# Patient Record
Sex: FEMALE | Race: WHITE | NOT HISPANIC OR LATINO | Employment: STUDENT | URBAN - METROPOLITAN AREA
[De-identification: names, ages, dates, MRNs, and addresses within clinical notes are randomized per-mention and may not be internally consistent; named-entity substitution may affect disease eponyms.]

---

## 2019-12-06 ENCOUNTER — APPOINTMENT (OUTPATIENT)
Dept: RADIOLOGY | Facility: OTHER | Age: 19
End: 2019-12-06
Payer: COMMERCIAL

## 2019-12-06 ENCOUNTER — OFFICE VISIT (OUTPATIENT)
Dept: OBGYN CLINIC | Facility: OTHER | Age: 19
End: 2019-12-06
Payer: COMMERCIAL

## 2019-12-06 VITALS
BODY MASS INDEX: 37.73 KG/M2 | HEIGHT: 64 IN | WEIGHT: 221 LBS | HEART RATE: 58 BPM | DIASTOLIC BLOOD PRESSURE: 81 MMHG | SYSTOLIC BLOOD PRESSURE: 119 MMHG

## 2019-12-06 DIAGNOSIS — M79.671 RIGHT FOOT PAIN: ICD-10-CM

## 2019-12-06 DIAGNOSIS — S99.921A INJURY OF RIGHT FOOT, INITIAL ENCOUNTER: Primary | ICD-10-CM

## 2019-12-06 PROCEDURE — 73620 X-RAY EXAM OF FOOT: CPT

## 2019-12-06 PROCEDURE — 73630 X-RAY EXAM OF FOOT: CPT

## 2019-12-06 PROCEDURE — 99204 OFFICE O/P NEW MOD 45 MIN: CPT | Performed by: FAMILY MEDICINE

## 2019-12-06 RX ORDER — RANITIDINE 150 MG/1
TABLET ORAL
Refills: 5 | COMMUNITY
Start: 2019-11-12

## 2019-12-06 RX ORDER — LORATADINE 10 MG/1
TABLET ORAL
Refills: 11 | COMMUNITY
Start: 2019-09-26

## 2019-12-06 RX ORDER — CITALOPRAM 20 MG/1
20 TABLET ORAL DAILY
COMMUNITY

## 2019-12-06 RX ORDER — NORGESTIMATE AND ETHINYL ESTRADIOL
1 KIT DAILY
Refills: 5 | COMMUNITY
Start: 2019-11-09

## 2019-12-06 NOTE — PATIENT INSTRUCTIONS
Explained the patient that she has clinical evidence of possible Lisfranc injury on her examination today  As such, I ordered MRI to be performed of her right foot  While waiting her MRI results and interpretation by physician, I recommended nonweightbearing status with heart flat soled shoe  I also recommend against participating in dance  I also explained the patient risk of compartment syndrome including worsening swelling with worsening pain, numbness or tingling or pain and instructed to have immediate evaluation by physician or go to emergency department if develops  Patient expressed understanding and agreed to plan

## 2019-12-06 NOTE — PROGRESS NOTES
1  Injury of right foot, initial encounter  XR foot 3+ vw right    CANCELED: XR foot 3+ vw right    CANCELED: XR foot 3+ vw left     Orders Placed This Encounter   Procedures    XR foot 3+ vw right        Imaging Studies (I personally reviewed images in PACS and report):  X-ray right foot 12/06/2019:  No acute osseous abnormality  X-ray bilateral foot comparison weight-bearing 12/06/2019:  No acute osseous abnormality  No definitive Lisfranc injury  IMPRESSION:  Right foot injury  Midfoot pain  + pronation abduction test concerning for Lisfranc injury    Differential diagnosis:  Lisfranc injury  Contusion      Repeat X-ray next visit:   None      Return for Follow-up after MRI  Patient Instructions    Explained the patient that she has clinical evidence of possible Lisfranc injury on her examination today  As such, I ordered MRI to be performed of her right foot  While waiting her MRI results and interpretation by physician, I recommended nonweightbearing status with heart flat soled shoe  I also recommend against participating in dance  I also explained the patient risk of compartment syndrome including worsening swelling with worsening pain, numbness or tingling or pain and instructed to have immediate evaluation by physician or go to emergency department if develops  Patient expressed understanding and agreed to plan  CHIEF COMPLAINT:  Right foot injury    HPI:  Yossi Leigh is a 20 y o  female  who presents for   Past medical history:  Significant for right ipsilateral foot injury approximately 8 months ago diagnosed with a foot contusion by  with resolved pain    Visit 12/06/2019:  Evaluation of right foot injury that occurred yesterday 12/05/2019 when 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 and landed on her forefoot result in in cracking sensation and immediate pain    Since then she describes constant sharp dull soreness 1 of 10 and at times worse exacerbated by walking  She has also noticed associated symptoms do include cracking and popping and points to the lateral forefoot  She was given a hard-soled postop shoe to wear by   She has not had any x-rays at this time  Today, she noticed no significant improvement  She continues to have swelling  She has been unable to participate in dance per        Review of Systems   Constitutional: Negative for chills, fever and unexpected weight change  HENT: Negative for hearing loss, nosebleeds and sore throat  Eyes: Negative for pain, redness and visual disturbance  Respiratory: Negative for cough, shortness of breath and wheezing  Cardiovascular: Negative for chest pain, palpitations and leg swelling  Gastrointestinal: Negative for abdominal distention, nausea and vomiting  Endocrine: Negative for polydipsia and polyuria  Genitourinary: Negative for dysuria and hematuria  Skin: Negative for rash and wound  Neurological: Negative for dizziness, numbness and headaches  Psychiatric/Behavioral: Negative for decreased concentration and suicidal ideas  Following history reviewed and update:    Past Medical History:   Diagnosis Date    Asthma      Past Surgical History:   Procedure Laterality Date    KNEE SURGERY       Social History   Social History     Substance and Sexual Activity   Alcohol Use Not on file     Social History     Substance and Sexual Activity   Drug Use Not on file     Social History     Tobacco Use   Smoking Status Never Smoker   Smokeless Tobacco Never Used     No family history on file    No Known Allergies       Physical Exam  /81 (BP Location: Right arm, Patient Position: Sitting, Cuff Size: Adult)   Pulse 58   Ht 5' 4" (1 626 m)   Wt 100 kg (221 lb)   BMI 37 93 kg/m²     Constitutional:  see vital signs  Gen: well-developed, normocephalic/atraumatic, well-groomed  Eyes: No inflammation or discharge of conjunctiva or lids; sclera clear   Pharynx: no inflammation, lesion, or mass of lips  Neck: supple, no masses, non-distended  MSK: no inflammation, lesion, mass, or clubbing of nails and digits except for other than mentioned below  SKIN: no visible rashes or skin lesions  Pulmonary/Chest: Effort normal  No respiratory distress     NEURO: cranial nerves grossly intact  PSYCH:  Alert and oriented to person, place, and time; recent and remote memory intact; mood normal, no depression, anxiety, or agitation, judgment and insight good and intact     Ortho Exam  RIGHT ANKLE & FOOT EXAM  Observation  GAIT:  normal    Inspection  Erythema: no  Ecchymosis: no  Edema:  none      Tenderness  Proximal Fibula: no  (Maisonneuve frx)  AiTFL: no  (2cm proximal-medial to tip lateral malleolus 92% sens, 29% spec)  ATFL: no  CFL: no  PTFL: no  Achilles:  no  deltoid: No  Peroneal: no  Tibialis Anterior: no  Tibialis Posterior: no    Bony Tenderpoints:  Lateral Malleolus: no  Base of 5th MT: no  Medial Malleolus: no  Navicular: no  Talar dome: No    ROM  Dorsiflexion: intact  Plantarflexion: intact    Muscle Strength  Pronation: intact without pain  Supination: intact without pain    Tib-Fib Squeeze: negative  (zbactjtorjlu-gq-tykmhdnpcpzqup squeeze; 26% sens, 88% spec; rule in test)    Calcaneal Squeeze: negative    Dorsiflexion (+) ER Stress Test: negative  (reproduce ATiFL mech; 71% sens, 63% spec)          Right Calf exam:  No swelling erythema or increased warmth  No palpable cords  Tenderness: none  Po Dorsiflexion DVT Test: Negative  (slight knee flexion, passive abrupt ankle dorsiflexion, squeeze calf)    Right foot exam  No swelling, erythema or increased warmth  Tenderness: none  ROM Toes extension: intact  ROM Toes flexion: intact  Strength Toes: 5/5 flex, ext  Sensation intact  Capillary refill intact    Right Lisfranc Assessment:  Plantar Ecchymosis:   Pronation Abduction test:   (forefoot abducted, pronate foot, hindfoot kept in place)  Tarsometatarsal Squeeze test:  (thumb lateral midfoot, other fingers medial midfoot, squeeze 1st & 5th rays)  Single Limb Heel Rise:  (unilateral stand on "tip toe":)  Piano Key Test:   (hindfoot stabilized, passive dorsiflexion & plantar flexion at TMT joint)     Procedures

## 2019-12-09 ENCOUNTER — HOSPITAL ENCOUNTER (OUTPATIENT)
Dept: RADIOLOGY | Facility: IMAGING CENTER | Age: 19
Discharge: HOME | End: 2019-12-09
Payer: COMMERCIAL

## 2019-12-09 DIAGNOSIS — S99.921A INJURY OF RIGHT FOOT, INITIAL ENCOUNTER: ICD-10-CM

## 2019-12-09 PROCEDURE — 73718 MRI LOWER EXTREMITY W/O DYE: CPT

## 2019-12-09 RX ORDER — NORGESTIMATE AND ETHINYL ESTRADIOL 7DAYSX3 28
KIT ORAL
COMMUNITY
Start: 2018-06-02 | End: 2019-12-10 | Stop reason: HOSPADM

## 2019-12-09 RX ORDER — AMITRIPTYLINE HYDROCHLORIDE 10 MG/1
TABLET, FILM COATED ORAL
COMMUNITY
End: 2019-12-10 | Stop reason: HOSPADM

## 2019-12-09 RX ORDER — GABAPENTIN 100 MG/1
CAPSULE ORAL
COMMUNITY
End: 2019-12-10 | Stop reason: HOSPADM

## 2019-12-09 RX ORDER — CHOLECALCIFEROL (VITAMIN D3) 125 MCG
CAPSULE ORAL
COMMUNITY

## 2019-12-10 ENCOUNTER — OFFICE VISIT (OUTPATIENT)
Dept: OBGYN CLINIC | Facility: OTHER | Age: 19
End: 2019-12-10
Payer: COMMERCIAL

## 2019-12-10 VITALS — HEART RATE: 73 BPM | SYSTOLIC BLOOD PRESSURE: 121 MMHG | DIASTOLIC BLOOD PRESSURE: 76 MMHG

## 2019-12-10 DIAGNOSIS — S99.921A INJURY OF RIGHT FOOT, INITIAL ENCOUNTER: Primary | ICD-10-CM

## 2019-12-10 PROCEDURE — 99213 OFFICE O/P EST LOW 20 MIN: CPT | Performed by: FAMILY MEDICINE

## 2019-12-10 NOTE — PROGRESS NOTES
1  Injury of right foot, initial encounter       No orders of the defined types were placed in this encounter  Imaging Studies (I personally reviewed images in PACS and report):  MRI right foot 12/10/2019:  Unremarkable MRI of the right foot  Intact Lisfranc ligament  Past diagnostics:    X-ray right foot 12/06/2019:  No acute osseous abnormality  X-ray bilateral foot comparison weight-bearing 12/06/2019:  No acute osseous abnormality  No definitive Lisfranc injury  IMPRESSION:  Right foot injury      Repeat X-ray next visit:   None      Return for Follow-up after holiday break  Patient Instructions   Explained to patient and her mother that her MRI does not reveal any evidence of fracture or any severe ligamentous sprain  As such, I recommended gradual transition as tolerated to full weight-bearing  Patient is upcoming for school break and I recommended that they acquire CD images and reports in case she does not have any improvement for next 1-2 weeks  If so recommended follow-up with orthopedic office in her hometown  Patient mother expressed understanding agreed to plan  Educated risks of mixing NSAIDS (also known as non-steroidal anti-inflammatory pills) including advil, ibuprofen, motrin, aleve, naproxen, aspirin, and ibuprofen containing products with each other or with steroids (such as prednisone, medrol)  Explained risks of mixing these medications including stomach ulcer, severe internal bleeding, and kidney failure  Instructed not to take NSAIDS if have history of stomach ulcers, kidney issues, or hypertension  Instructed patient to use only one brand as prescribed  For naproxen, a maximum of 500 mg per dose every 12 hours and no more than two doses or 1,000mg per day  For Ibuprofen, a maximum of 800 mg per dose every 6 hours but no more than 3 doses or 2,400 mg per day  Never take these medications together  Never take these medications the same day   For severe pain and only if you have no liver problems, you may add Tylenol (also known as acetaminophen) maximum of 1,000  Mg per dose every 6 hours but no more 3 doses or 3,000 mg per day  Patient expressed understanding and agreed to plan  CHIEF COMPLAINT:  Right foot injury    HPI:  Viv Mortensen is a 20 y o  female  who presents for   Past medical history:  Significant for right ipsilateral foot injury approximately 8 months ago diagnosed with a foot contusion by  with resolved pain      12/10/2019: Follow-up right foot injury:  No significant improvement since her last visit  Overall feels approximately 25% of her usual baseline  Today she presents with her mother  She has continued to use her crutches intermittently since her last visit  She still has diffuse foot pain  Denies any worsening  Review of Systems   Constitutional: Negative for chills and fever  Neurological: Negative for weakness and numbness  Following history reviewed and update:    Past Medical History:   Diagnosis Date    Asthma      Past Surgical History:   Procedure Laterality Date    KNEE SURGERY       Social History   Social History     Substance and Sexual Activity   Alcohol Use Not on file     Social History     Substance and Sexual Activity   Drug Use Not on file     Social History     Tobacco Use   Smoking Status Never Smoker   Smokeless Tobacco Never Used     No family history on file    Allergies   Allergen Reactions    Lactose Intolerance (Gi)           Physical Exam  /76 (BP Location: Right arm, Patient Position: Sitting, Cuff Size: Adult)   Pulse 73   LMP 12/07/2019 (Exact Date)     Constitutional:  see vital signs  Gen: well-developed, normocephalic/atraumatic, well-groomed  Eyes: No inflammation or discharge of conjunctiva or lids; sclera clear   Pharynx: no inflammation, lesion, or mass of lips  Neck: supple, no masses, non-distended  MSK: no inflammation, lesion, mass, or clubbing of nails and digits except for other than mentioned below  SKIN: no visible rashes or skin lesions  Pulmonary/Chest: Effort normal  No respiratory distress  NEURO: cranial nerves grossly intact  PSYCH:  Alert and oriented to person, place, and time; recent and remote memory intact; mood normal, no depression, anxiety, or agitation, judgment and insight good and intact      Ortho Exam  RIGHT ANKLE & FOOT EXAM  Observation  GAIT:  Normal gait  Initial reluctance when transition to weight-bearing examination room  Normal gait when walking in hard-soled postop shoe      Inspection  Erythema: no  Ecchymosis: no  Edema:  Mild diffuse foot  No pain with passive stretching    Tenderness  Proximal Fibula: no  (Maisonneuve frx)  AiTFL: no  (2cm proximal-medial to tip lateral malleolus 92% sens, 29% spec)  ATFL: no  CFL: no  PTFL: no  Achilles:  no  deltoid: No  Peroneal: no  Tibialis Anterior: no  Tibialis Posterior: no    Bony Tenderpoints:  Lateral Malleolus: no  Base of 5th MT: no  Medial Malleolus: no  Navicular: no  Talar dome: No    ROM  Dorsiflexion: intact  Plantarflexion: intact    Muscle Strength  Pronation: intact without pain  Supination: intact without pain    Tib-Fib Squeeze: negative  (qvadlicsmvws-ea-okjfwgiguylrwk squeeze; 26% sens, 88% spec; rule in test)    Calcaneal Squeeze: negative    Dorsiflexion (+) ER Stress Test: negative  (reproduce ATiFL mech; 71% sens, 63% spec)       Right Calf exam:  No swelling erythema or increased warmth  No palpable cords  Tenderness: none      Right foot exam  No swelling, erythema or increased warmth  Tenderness:  Diffuse metatarsals worst 2nd metatarsal midshaft  ROM Toes extension: intact  ROM Toes flexion: intact  Strength Toes: 5/5 flex, ext  Sensation intact  Capillary refill intact    Right Lisfranc Assessment:  Plantar Ecchymosis:  Negative  Pronation Abduction test:  Negative  (forefoot abducted, pronate foot, hindfoot kept in place)  Tarsometatarsal Squeeze test:+  (thumb lateral midfoot, other fingers medial midfoot, squeeze 1st & 5th rays)  Piano Key Test:  Negative  (hindfoot stabilized, passive dorsiflexion & plantar flexion at TMT joint)   Able to stand on toes bilateral  Procedures

## 2019-12-10 NOTE — PATIENT INSTRUCTIONS
Explained to patient and her mother that her MRI does not reveal any evidence of fracture or any severe ligamentous sprain  As such, I recommended gradual transition as tolerated to full weight-bearing  Patient is upcoming for school break and I recommended that they acquire CD images and reports in case she does not have any improvement for next 1-2 weeks  If so recommended follow-up with orthopedic office in her hometown  Patient mother expressed understanding agreed to plan  Educated risks of mixing NSAIDS (also known as non-steroidal anti-inflammatory pills) including advil, ibuprofen, motrin, aleve, naproxen, aspirin, and ibuprofen containing products with each other or with steroids (such as prednisone, medrol)  Explained risks of mixing these medications including stomach ulcer, severe internal bleeding, and kidney failure  Instructed not to take NSAIDS if have history of stomach ulcers, kidney issues, or hypertension  Instructed patient to use only one brand as prescribed  For naproxen, a maximum of 500 mg per dose every 12 hours and no more than two doses or 1,000mg per day  For Ibuprofen, a maximum of 800 mg per dose every 6 hours but no more than 3 doses or 2,400 mg per day  Never take these medications together  Never take these medications the same day  For severe pain and only if you have no liver problems, you may add Tylenol (also known as acetaminophen) maximum of 1,000  Mg per dose every 6 hours but no more 3 doses or 3,000 mg per day  Patient expressed understanding and agreed to plan

## 2019-12-10 NOTE — LETTER
December 10, 2019     Patient: Liam Nichols   YOB: 2000   Date of Visit: 12/10/2019       To Whom it May Concern:    Liam Nichols is under my professional care  She was seen in my office on 12/10/2019  She may return to gym class or sports on 12/10/2019 as tolerated  If you have any questions or concerns, please don't hesitate to call           Sincerely,          Zac Quiñonez III, DO        CC: No Recipients

## 2021-04-12 DIAGNOSIS — Z23 ENCOUNTER FOR IMMUNIZATION: ICD-10-CM

## 2021-04-27 ENCOUNTER — APPOINTMENT (OUTPATIENT)
Dept: FAMILY MEDICINE CLINIC | Facility: HOSPITAL | Age: 21
End: 2021-04-27

## 2021-04-27 DIAGNOSIS — Z23 ENCOUNTER FOR IMMUNIZATION: Primary | ICD-10-CM

## 2021-04-27 PROCEDURE — 91300 SARS-COV-2 / COVID-19 MRNA VACCINE (PFIZER-BIONTECH) 30 MCG: CPT

## 2021-04-27 PROCEDURE — 0001A SARS-COV-2 / COVID-19 MRNA VACCINE (PFIZER-BIONTECH) 30 MCG: CPT

## 2021-05-18 ENCOUNTER — IMMUNIZATIONS (OUTPATIENT)
Dept: FAMILY MEDICINE CLINIC | Facility: HOSPITAL | Age: 21
End: 2021-05-18

## 2021-05-18 DIAGNOSIS — Z23 ENCOUNTER FOR IMMUNIZATION: Primary | ICD-10-CM

## 2021-05-18 PROCEDURE — 91300 SARS-COV-2 / COVID-19 MRNA VACCINE (PFIZER-BIONTECH) 30 MCG: CPT

## 2021-05-18 PROCEDURE — 0002A SARS-COV-2 / COVID-19 MRNA VACCINE (PFIZER-BIONTECH) 30 MCG: CPT

## 2022-03-28 ENCOUNTER — APPOINTMENT (OUTPATIENT)
Dept: RADIOLOGY | Facility: OTHER | Age: 22
End: 2022-03-28
Payer: COMMERCIAL

## 2022-03-28 ENCOUNTER — OFFICE VISIT (OUTPATIENT)
Dept: OBGYN CLINIC | Facility: OTHER | Age: 22
End: 2022-03-28
Payer: COMMERCIAL

## 2022-03-28 VITALS
DIASTOLIC BLOOD PRESSURE: 79 MMHG | HEIGHT: 63 IN | SYSTOLIC BLOOD PRESSURE: 117 MMHG | BODY MASS INDEX: 29.86 KG/M2 | HEART RATE: 74 BPM | WEIGHT: 168.5 LBS

## 2022-03-28 DIAGNOSIS — D16.22 OSTEOCHONDROMA OF LEFT TIBIA: ICD-10-CM

## 2022-03-28 DIAGNOSIS — M79.604 BILATERAL LEG PAIN: Primary | ICD-10-CM

## 2022-03-28 DIAGNOSIS — M79.605 BILATERAL LEG PAIN: Primary | ICD-10-CM

## 2022-03-28 DIAGNOSIS — M79.604 BILATERAL LEG PAIN: ICD-10-CM

## 2022-03-28 DIAGNOSIS — D16.21 OSTEOCHONDROMA OF RIGHT TIBIA: ICD-10-CM

## 2022-03-28 DIAGNOSIS — M79.605 BILATERAL LEG PAIN: ICD-10-CM

## 2022-03-28 PROCEDURE — 99214 OFFICE O/P EST MOD 30 MIN: CPT | Performed by: ORTHOPAEDIC SURGERY

## 2022-03-28 PROCEDURE — 73590 X-RAY EXAM OF LOWER LEG: CPT

## 2022-03-28 RX ORDER — BUSPIRONE HYDROCHLORIDE 10 MG/1
TABLET ORAL
COMMUNITY
Start: 2022-03-17

## 2022-03-28 NOTE — PROGRESS NOTES
Chief Complaint: Bilateral leg pain     HPI:    Raúl Yao is a 24year old Female who presents today for new evaluation and treatment of bilateral shin pain  Description of symptoms:  Patient is a dance/performing art athlete at [employer] who presents today for evaluation of bilateral leg pain of nearly 2 weeks duration  Denies any specific injury prior to the onset of symptoms  She does mention that symptoms are precipitated with prolonged dancing/ballet  She does not give any known history of previous stress fractures  Currently, she is able to fully weightbear and ambulate without much discomfort and reports that her bilateral leg pain is now gradually improving  She denies any pain at rest or nighttime pain  Denies any calf pain and denies any tingling numbness of the extremities  Denies any associated low back pain  I have personally reviewed pertinent films in PACS and my interpretation is As noted below:  Plain radiograph of bilateral tibia and fibula were performed on today's visit  No acute osseous injury or significant periosteal reaction is noted  Osseous swelling noted in bilateral proximal medial tibia indicative of possible osteochondroma  No significant periosteal reaction is noted  There is no problem list on file for this patient  Current Outpatient Medications on File Prior to Visit   Medication Sig Dispense Refill    citalopram (CeleXA) 20 mg tablet Take 20 mg by mouth daily      lactase (LACTAID) 3,000 units tablet Take by mouth      loratadine (CLARITIN) 10 mg tablet TAKE 1 TABLET BY MOUTH EVERY DAY FOR ALLERGIES  11    ranitidine (ZANTAC) 150 mg tablet TAKE 1 TABLET BY ORAL ROUTE 2 TIMES PER DAY FOR STOMACH  5    TRI-LO-CHANTEL 0 18/0 215/0 25 MG-25 MCG per tablet Take 1 tablet by mouth daily  5     No current facility-administered medications on file prior to visit          Allergies   Allergen Reactions    Lactose Intolerance (Gi) - Food Allergy         Tobacco Use: Not on file        Social Determinants of Health     Tobacco Use: Not on file   Alcohol Use: Not on file   Financial Resource Strain: Not on file   Food Insecurity: Not on file   Transportation Needs: Not on file   Physical Activity: Not on file   Stress: Not on file   Social Connections: Not on file   Intimate Partner Violence: Not on file   Depression: Not on file   Housing Stability: Not on file               Review of Systems     Body mass index is 29 85 kg/m²  Physical Exam  Vitals and nursing note reviewed  Exam conducted with a chaperone present  Constitutional:       General: She is not in acute distress  Appearance: She is well-developed  HENT:      Head: Normocephalic and atraumatic  Eyes:      Conjunctiva/sclera: Conjunctivae normal    Cardiovascular:      Rate and Rhythm: Normal rate and regular rhythm  Heart sounds: No murmur heard  Pulmonary:      Effort: Pulmonary effort is normal  No respiratory distress  Breath sounds: Normal breath sounds  Abdominal:      Palpations: Abdomen is soft  Tenderness: There is no abdominal tenderness  Musculoskeletal:      Cervical back: Neck supple  Skin:     General: Skin is warm and dry  Neurological:      Mental Status: She is alert  Ortho Exam:  Lumbar spine exam:    No scoliosis  Good range of lumbar spine flexion extension without discomfort and no focal neurological deficits of lower extremities  Mild left hamstring tightness with popliteal angle of 25° and a right popliteal angle of 15°    Bilateral knee exam:  Within normal limits    Right leg exam:  Tender to palpation over the medial border of the tibia at the junction of proximal and middle 3rd  No discomfort with gentle tapping or tibial stressing  Ipsilateral Achilles tightness noted with passive dorsiflexion of the right ankle being 0° with the knee fully extended    Patient is able to fully weightbear and ambulate including tiptoe walk and heel walk without pain  Clinically intact distal neurovascular status  Ankle hyperpronation with pes planus noted    Left leg exam:    Tender to palpation mildly over the medial border of the tibia at the junction of proximal and middle 3rd  No discomfort with gentle tapping or tibial stressing  Ipsilateral Achilles tightness noted with passive dorsiflexion of the left ankle being 0° with the knee fully extended  Patient is able to fully weightbear and ambulate including tiptoe walk and heel walk without pain  Clinically intact distal neurovascular status  Ankle hyperpronation with pes planus noted  Procedures       Assessment:     Diagnosis ICD-10-CM Associated Orders   1  Bilateral leg pain  M79 604 XR tibia fibula 2 vw left    M79 605 XR tibia fibula 2 vw right   2  Osteochondroma of left tibia  D16 22    3  Osteochondroma of right tibia  D16 21         Plan:    I explained my current clinical findings to [address] [place]  She likely has bilateral medial tibial stress syndrome  Incidentally, she is also noted to have bilateral proximal medial tibia osteochondromas on radiological assessment  She does have some mild discomfort to palpation in this area clinically as well bilaterally  She does have an upcoming dance performance this weekend  After careful discussion she is agreeable to stopping all dance activity and lower extremity impact activity after her performance this weekend  Thereafter, I have advised her to follow-up after about 3 weeks  If she still has continued tenderness of her leg/tibia we will consider doing advanced imaging for assessment regarding her osteochondroma as well as underlying stress injury  In the interim, I have provided her with printed handout of bilateral ankle exercises  Patient expressed understanding and is agreeable with this plan of management  Portions of the record may have been created with voice recognition software  Occasional wrong word or "sound alike" substitutions may have occurred due to the inherent limitations of voice recognition software  Please review the chart carefully and recognize, using context, where substitutions/typographical errors may have occurred

## 2022-03-28 NOTE — PATIENT INSTRUCTIONS
Ankle Exercises   AMBULATORY CARE:   What you need to know about ankle exercises: Ankle exercises help strengthen your ankle and improve its function after injury  These are beginning exercises  Ask your healthcare provider if you need to see a physical therapist for more advanced exercises  · Do these exercises 3 to 5 days a week , or as directed by your healthcare provider  Ask if you should perform the exercises on each ankle  · Do the exercises in the order that your healthcare provider recommends  This will help prevent swelling, chronic pain, and reinjury  Start with range of motion exercises  Then progress to strengthening exercises, and finally to balancing exercises  · Warm up before you do ankle exercises  Walk or ride a stationary bike for 5 to 10 minutes to prepare your ankle for movement  · Stop if you feel pain  It is normal to feel some discomfort at first  Regular exercise will help decrease your discomfort over time  How to perform range of motion exercises safely:  Begin with range of motion exercises to improve flexibility  Ask your healthcare provider when you can progress to strengthening exercises  · Ankle alphabet:  Sit on a chair so that your feet do not touch the floor  Use your big toe to write each letter of the alphabet  Use only your foot and ankle, and keep your movements small  Do 2 sets  · Calf stretches:      ? Sitting calf stretches with a towel:  Sit on the floor with both legs out straight in front of you  Loop a towel around the ball of your injured foot  Grasp the ends of the towel and pull it toward you  Keep your leg and back straight  Do not lean forward as you pull the towel  Hold for 30 seconds  Then relax for 30 seconds  Do 2 sets of 10          ? Standing calf stretches:  Stand facing a wall with the foot that is not injured forward and your knee slightly bent   Keep the leg with the injured foot straight and behind you with your toes pointed in slightly  With both heels flat on the floor, press your hips forward  Do not arch your back  Hold for 30 seconds, and then relax for 30 seconds  Do 2 sets of 10  Repeat with your leg bent  Do 2 sets of 10  How to perform strengthening exercises safely:  After you can perform range of motion exercises without pain, you may begin strengthening exercises  Ask your healthcare provider when you can progress to balancing exercises  · Ankle movement in 4 directions:  Sit on the floor with your legs straight in front of you  Keep your heels on the floor for support  ? Dorsiflexion:  Begin with your toes pointing straight up  Pull your toes toward your body  Slowly return to the starting position  Do 3 sets of 5      ? Plantar flexion:  Begin with your toes pointing straight up  Push your toes away from your body  Slowly return to the starting position  Do 3 sets of 5          ? Inversion:  Begin with your toes pointing straight up  Push your toes inward, toward each other  Slowly return to the starting position  Do 3 sets of 5      ? Eversion:  Begin with your toes pointing straight up  Push your toes outward, away from each other  Slowly return to the starting position  Do 3 sets of 5        · Toe curls with a towel:  Sit on a chair so that both of your feet are flat on the floor  Place a small towel on the floor in front of your injured foot  Grab the center of the towel with your toes and curl the towel toward you  Relax and repeat  Do 1 set of 5          · [place] pick-ups:  Sit on a chair so that both of your feet are flat on the floor  Place 20 marbles on the floor in front of your injured foot  Use your toes to  one marble at a time and place it into a bowl  Repeat until you have picked up all the marbles  Do 1 set  · Heel raises:      ? Single leg heel raises:  Stand with your weight evenly on both feet  Hold on to a chair or a wall for balance   Lift the foot that is not injured off the floor so all your weight is placed on your injured foot  Raise the heel of your injured foot as high as you can  Slowly lower your heel to the floor  Do 1 set of 10          ? Double leg heel raises:  Stand with your weight evenly on both feet  Hold on to a chair or a wall for balance  Raise both of your heels as high as you can  Slowly lower your heels to the floor  Do 1 set of 10  · Heel and toe walks:      ? Heel walks:  Begin in a standing position  Lift your toes off the floor and walk on your heels  Keep your toes lifted as high as possible  Do 2 sets of 10          ? Toe walks:  Begin in a standing position  Lift your heels off the floor and walk on the balls and toes of your feet  Keep your heels lifted as high as possible  Do 2 sets of 10  How to perform a balance exercise safely:  After you can perform strengthening exercises without pain, you may do this beginning balancing exercise  Ask your healthcare provider for more advanced balance exercises  · Single leg stance:  Stand with your weight evenly on both feet, or hold on to a chair or a wall  Do not lean to the side  Lift the foot that is not injured off the floor so all your weight is placed on your injured foot  Balance on your injured foot  Ask your healthcare provider how long to hold this position  Contact your healthcare provider if:   · Your pain becomes worse  · You have new pain  · You have questions or concerns about your condition, care, or exercise program     © Copyright [employer] 2022 Information is for End User's use only and may not be sold, redistributed or otherwise used for commercial purposes  All illustrations and images included in CareNotes® are the copyrighted property of [employer]  or [employer]   The above information is an  only  It is not intended as medical advice for individual conditions or treatments   Talk to your doctor, nurse or pharmacist before following any medical regimen to see if it is safe and effective for you